# Patient Record
Sex: FEMALE | Race: WHITE | NOT HISPANIC OR LATINO | Employment: FULL TIME | URBAN - METROPOLITAN AREA
[De-identification: names, ages, dates, MRNs, and addresses within clinical notes are randomized per-mention and may not be internally consistent; named-entity substitution may affect disease eponyms.]

---

## 2019-04-19 ENCOUNTER — HOSPITAL ENCOUNTER (OUTPATIENT)
Facility: HOSPITAL | Age: 44
Setting detail: OBSERVATION
Discharge: HOME | End: 2019-04-20
Attending: EMERGENCY MEDICINE | Admitting: INTERNAL MEDICINE

## 2019-04-19 ENCOUNTER — APPOINTMENT (OUTPATIENT)
Dept: GENERAL RADIOLOGY | Facility: HOSPITAL | Age: 44
End: 2019-04-19

## 2019-04-19 DIAGNOSIS — I20.8 ANGINA AT REST (HCC): Primary | ICD-10-CM

## 2019-04-19 DIAGNOSIS — R77.8 ELEVATED TROPONIN: ICD-10-CM

## 2019-04-19 LAB
ALBUMIN SERPL-MCNC: 4.4 G/DL (ref 3.5–5.2)
ALBUMIN/GLOB SERPL: 1.2 G/DL
ALP SERPL-CCNC: 95 U/L (ref 39–117)
ALT SERPL W P-5'-P-CCNC: 89 U/L (ref 1–33)
ANION GAP SERPL CALCULATED.3IONS-SCNC: 13 MMOL/L
AST SERPL-CCNC: 71 U/L (ref 1–32)
BASOPHILS # BLD AUTO: 0.04 10*3/MM3 (ref 0–0.2)
BASOPHILS NFR BLD AUTO: 0.4 % (ref 0–1.5)
BILIRUB SERPL-MCNC: 0.4 MG/DL (ref 0.2–1.2)
BUN BLD-MCNC: 13 MG/DL (ref 6–20)
BUN/CREAT SERPL: 14.8 (ref 7–25)
CALCIUM SPEC-SCNC: 9.9 MG/DL (ref 8.6–10.5)
CHLORIDE SERPL-SCNC: 98 MMOL/L (ref 98–107)
CO2 SERPL-SCNC: 24 MMOL/L (ref 22–29)
CREAT BLD-MCNC: 0.88 MG/DL (ref 0.57–1)
DEPRECATED RDW RBC AUTO: 42.8 FL (ref 37–54)
EOSINOPHIL # BLD AUTO: 0.13 10*3/MM3 (ref 0–0.4)
EOSINOPHIL NFR BLD AUTO: 1.4 % (ref 0.3–6.2)
ERYTHROCYTE [DISTWIDTH] IN BLOOD BY AUTOMATED COUNT: 13.1 % (ref 12.3–15.4)
GFR SERPL CREATININE-BSD FRML MDRD: 69 ML/MIN/1.73
GLOBULIN UR ELPH-MCNC: 3.8 GM/DL
GLUCOSE BLD-MCNC: 112 MG/DL (ref 65–99)
HCT VFR BLD AUTO: 39.9 % (ref 34–46.6)
HGB BLD-MCNC: 13.1 G/DL (ref 12–15.9)
HOLD SPECIMEN: NORMAL
HOLD SPECIMEN: NORMAL
IMM GRANULOCYTES # BLD AUTO: 0.02 10*3/MM3 (ref 0–0.05)
IMM GRANULOCYTES NFR BLD AUTO: 0.2 % (ref 0–0.5)
LIPASE SERPL-CCNC: 94 U/L (ref 13–60)
LYMPHOCYTES # BLD AUTO: 2.96 10*3/MM3 (ref 0.7–3.1)
LYMPHOCYTES NFR BLD AUTO: 30.9 % (ref 19.6–45.3)
MCH RBC QN AUTO: 29.6 PG (ref 26.6–33)
MCHC RBC AUTO-ENTMCNC: 32.8 G/DL (ref 31.5–35.7)
MCV RBC AUTO: 90.3 FL (ref 79–97)
MONOCYTES # BLD AUTO: 0.8 10*3/MM3 (ref 0.1–0.9)
MONOCYTES NFR BLD AUTO: 8.4 % (ref 5–12)
NEUTROPHILS # BLD AUTO: 5.65 10*3/MM3 (ref 1.7–7)
NEUTROPHILS NFR BLD AUTO: 58.9 % (ref 42.7–76)
NT-PROBNP SERPL-MCNC: 307.3 PG/ML (ref 5–450)
PLATELET # BLD AUTO: 268 10*3/MM3 (ref 140–450)
PMV BLD AUTO: 10.8 FL (ref 6–12)
POTASSIUM BLD-SCNC: 4.3 MMOL/L (ref 3.5–5.2)
PROT SERPL-MCNC: 8.2 G/DL (ref 6–8.5)
RBC # BLD AUTO: 4.42 10*6/MM3 (ref 3.77–5.28)
SODIUM BLD-SCNC: 135 MMOL/L (ref 136–145)
TROPONIN I SERPL-MCNC: 2.51 NG/ML (ref 0–0.07)
WBC NRBC COR # BLD: 9.58 10*3/MM3 (ref 3.4–10.8)
WHOLE BLOOD HOLD SPECIMEN: NORMAL
WHOLE BLOOD HOLD SPECIMEN: NORMAL

## 2019-04-19 PROCEDURE — 84484 ASSAY OF TROPONIN QUANT: CPT

## 2019-04-19 PROCEDURE — 93005 ELECTROCARDIOGRAM TRACING: CPT | Performed by: EMERGENCY MEDICINE

## 2019-04-19 PROCEDURE — 99285 EMERGENCY DEPT VISIT HI MDM: CPT

## 2019-04-19 PROCEDURE — 80053 COMPREHEN METABOLIC PANEL: CPT | Performed by: EMERGENCY MEDICINE

## 2019-04-19 PROCEDURE — 85025 COMPLETE CBC W/AUTO DIFF WBC: CPT | Performed by: EMERGENCY MEDICINE

## 2019-04-19 PROCEDURE — 96372 THER/PROPH/DIAG INJ SC/IM: CPT

## 2019-04-19 PROCEDURE — 84484 ASSAY OF TROPONIN QUANT: CPT | Performed by: INTERNAL MEDICINE

## 2019-04-19 PROCEDURE — 71045 X-RAY EXAM CHEST 1 VIEW: CPT

## 2019-04-19 PROCEDURE — 25010000002 ENOXAPARIN PER 10 MG: Performed by: PHYSICIAN ASSISTANT

## 2019-04-19 PROCEDURE — 83880 ASSAY OF NATRIURETIC PEPTIDE: CPT | Performed by: EMERGENCY MEDICINE

## 2019-04-19 PROCEDURE — 83690 ASSAY OF LIPASE: CPT | Performed by: EMERGENCY MEDICINE

## 2019-04-19 RX ORDER — LISINOPRIL 5 MG/1
2.5 TABLET ORAL DAILY
COMMUNITY

## 2019-04-19 RX ORDER — CLOPIDOGREL BISULFATE 75 MG/1
75 TABLET ORAL DAILY
COMMUNITY

## 2019-04-19 RX ORDER — ATORVASTATIN CALCIUM 80 MG/1
80 TABLET, FILM COATED ORAL NIGHTLY
COMMUNITY

## 2019-04-19 RX ORDER — ASPIRIN 81 MG/1
324 TABLET, CHEWABLE ORAL ONCE
Status: COMPLETED | OUTPATIENT
Start: 2019-04-19 | End: 2019-04-19

## 2019-04-19 RX ORDER — ASPIRIN 81 MG/1
81 TABLET ORAL DAILY
COMMUNITY

## 2019-04-19 RX ORDER — SODIUM CHLORIDE 0.9 % (FLUSH) 0.9 %
10 SYRINGE (ML) INJECTION AS NEEDED
Status: DISCONTINUED | OUTPATIENT
Start: 2019-04-19 | End: 2019-04-20 | Stop reason: HOSPADM

## 2019-04-19 RX ADMIN — NITROGLYCERIN 1 INCH: 20 OINTMENT TOPICAL at 22:54

## 2019-04-19 RX ADMIN — ASPIRIN 81 MG 243 MG: 81 TABLET ORAL at 21:07

## 2019-04-19 RX ADMIN — ENOXAPARIN SODIUM 100 MG: 100 INJECTION SUBCUTANEOUS at 22:52

## 2019-04-20 VITALS
WEIGHT: 258.4 LBS | TEMPERATURE: 98 F | RESPIRATION RATE: 18 BRPM | SYSTOLIC BLOOD PRESSURE: 127 MMHG | BODY MASS INDEX: 39.16 KG/M2 | HEART RATE: 70 BPM | DIASTOLIC BLOOD PRESSURE: 80 MMHG | OXYGEN SATURATION: 95 % | HEIGHT: 68 IN

## 2019-04-20 PROBLEM — R74.01 ELEVATED TRANSAMINASE LEVEL: Status: ACTIVE | Noted: 2019-04-20

## 2019-04-20 PROBLEM — R74.01 ELEVATED TRANSAMINASE LEVEL: Status: RESOLVED | Noted: 2019-04-20 | Resolved: 2019-04-20

## 2019-04-20 PROBLEM — I10 ESSENTIAL HYPERTENSION: Status: ACTIVE | Noted: 2019-04-20

## 2019-04-20 PROBLEM — I25.2 HX OF NON-ST ELEVATION MYOCARDIAL INFARCTION (NSTEMI): Status: ACTIVE | Noted: 2019-04-20

## 2019-04-20 PROBLEM — R73.9 HYPERGLYCEMIA: Status: ACTIVE | Noted: 2019-04-20

## 2019-04-20 PROBLEM — R73.9 HYPERGLYCEMIA: Status: RESOLVED | Noted: 2019-04-20 | Resolved: 2019-04-20

## 2019-04-20 PROBLEM — I20.8 ANGINA AT REST (HCC): Status: RESOLVED | Noted: 2019-04-19 | Resolved: 2019-04-20

## 2019-04-20 LAB
ALBUMIN SERPL-MCNC: 3.8 G/DL (ref 3.5–5.2)
ALBUMIN/GLOB SERPL: 1.2 G/DL
ALP SERPL-CCNC: 83 U/L (ref 39–117)
ALT SERPL W P-5'-P-CCNC: 75 U/L (ref 1–33)
ANION GAP SERPL CALCULATED.3IONS-SCNC: 14 MMOL/L
AST SERPL-CCNC: 48 U/L (ref 1–32)
BILIRUB SERPL-MCNC: 0.4 MG/DL (ref 0.2–1.2)
BUN BLD-MCNC: 14 MG/DL (ref 6–20)
BUN/CREAT SERPL: 17.5 (ref 7–25)
CALCIUM SPEC-SCNC: 9 MG/DL (ref 8.6–10.5)
CHLORIDE SERPL-SCNC: 100 MMOL/L (ref 98–107)
CHOLEST SERPL-MCNC: 118 MG/DL (ref 0–200)
CO2 SERPL-SCNC: 22 MMOL/L (ref 22–29)
CREAT BLD-MCNC: 0.8 MG/DL (ref 0.57–1)
DEPRECATED RDW RBC AUTO: 43.1 FL (ref 37–54)
ERYTHROCYTE [DISTWIDTH] IN BLOOD BY AUTOMATED COUNT: 13.1 % (ref 12.3–15.4)
GFR SERPL CREATININE-BSD FRML MDRD: 78 ML/MIN/1.73
GLOBULIN UR ELPH-MCNC: 3.2 GM/DL
GLUCOSE BLD-MCNC: 105 MG/DL (ref 65–99)
HAV IGM SERPL QL IA: NORMAL
HBA1C MFR BLD: 4.9 % (ref 4.8–5.6)
HBV CORE IGM SERPL QL IA: NORMAL
HBV SURFACE AG SERPL QL IA: NORMAL
HCT VFR BLD AUTO: 36.4 % (ref 34–46.6)
HCV AB SER DONR QL: NORMAL
HDLC SERPL-MCNC: 38 MG/DL (ref 40–60)
HGB BLD-MCNC: 11.7 G/DL (ref 12–15.9)
INR PPP: 1.05 (ref 0.85–1.16)
LDLC SERPL CALC-MCNC: 53 MG/DL (ref 0–100)
LDLC/HDLC SERPL: 1.39 {RATIO}
MCH RBC QN AUTO: 29.2 PG (ref 26.6–33)
MCHC RBC AUTO-ENTMCNC: 32.1 G/DL (ref 31.5–35.7)
MCV RBC AUTO: 90.8 FL (ref 79–97)
PLATELET # BLD AUTO: 227 10*3/MM3 (ref 140–450)
PMV BLD AUTO: 10.9 FL (ref 6–12)
POTASSIUM BLD-SCNC: 3.9 MMOL/L (ref 3.5–5.2)
PROT SERPL-MCNC: 7 G/DL (ref 6–8.5)
PROTHROMBIN TIME: 13.2 SECONDS (ref 11.2–14.3)
RBC # BLD AUTO: 4.01 10*6/MM3 (ref 3.77–5.28)
SODIUM BLD-SCNC: 136 MMOL/L (ref 136–145)
TRIGL SERPL-MCNC: 136 MG/DL (ref 0–150)
TROPONIN T SERPL-MCNC: 0.66 NG/ML (ref 0–0.03)
TROPONIN T SERPL-MCNC: 0.8 NG/ML (ref 0–0.03)
TSH SERPL DL<=0.05 MIU/L-ACNC: 3.56 MIU/ML (ref 0.27–4.2)
VLDLC SERPL-MCNC: 27.2 MG/DL
WBC NRBC COR # BLD: 10.13 10*3/MM3 (ref 3.4–10.8)

## 2019-04-20 PROCEDURE — 84443 ASSAY THYROID STIM HORMONE: CPT | Performed by: PHYSICIAN ASSISTANT

## 2019-04-20 PROCEDURE — 99243 OFF/OP CNSLTJ NEW/EST LOW 30: CPT | Performed by: INTERNAL MEDICINE

## 2019-04-20 PROCEDURE — 80074 ACUTE HEPATITIS PANEL: CPT | Performed by: PHYSICIAN ASSISTANT

## 2019-04-20 PROCEDURE — G0378 HOSPITAL OBSERVATION PER HR: HCPCS

## 2019-04-20 PROCEDURE — 99220 PR INITIAL OBSERVATION CARE/DAY 70 MINUTES: CPT | Performed by: INTERNAL MEDICINE

## 2019-04-20 PROCEDURE — 84484 ASSAY OF TROPONIN QUANT: CPT | Performed by: PHYSICIAN ASSISTANT

## 2019-04-20 PROCEDURE — 83036 HEMOGLOBIN GLYCOSYLATED A1C: CPT | Performed by: PHYSICIAN ASSISTANT

## 2019-04-20 PROCEDURE — 99217 PR OBSERVATION CARE DISCHARGE MANAGEMENT: CPT | Performed by: INTERNAL MEDICINE

## 2019-04-20 PROCEDURE — 80053 COMPREHEN METABOLIC PANEL: CPT | Performed by: PHYSICIAN ASSISTANT

## 2019-04-20 PROCEDURE — 85610 PROTHROMBIN TIME: CPT | Performed by: PHYSICIAN ASSISTANT

## 2019-04-20 PROCEDURE — 85027 COMPLETE CBC AUTOMATED: CPT | Performed by: PHYSICIAN ASSISTANT

## 2019-04-20 PROCEDURE — 80061 LIPID PANEL: CPT | Performed by: PHYSICIAN ASSISTANT

## 2019-04-20 RX ORDER — NITROGLYCERIN 0.4 MG/1
0.4 TABLET SUBLINGUAL
Status: DISCONTINUED | OUTPATIENT
Start: 2019-04-20 | End: 2019-04-20 | Stop reason: HOSPADM

## 2019-04-20 RX ORDER — ATORVASTATIN CALCIUM 40 MG/1
80 TABLET, FILM COATED ORAL NIGHTLY
Status: DISCONTINUED | OUTPATIENT
Start: 2019-04-20 | End: 2019-04-20 | Stop reason: HOSPADM

## 2019-04-20 RX ORDER — SODIUM CHLORIDE 0.9 % (FLUSH) 0.9 %
3 SYRINGE (ML) INJECTION EVERY 12 HOURS SCHEDULED
Status: DISCONTINUED | OUTPATIENT
Start: 2019-04-20 | End: 2019-04-20 | Stop reason: HOSPADM

## 2019-04-20 RX ORDER — NITROGLYCERIN 0.4 MG/1
0.4 TABLET SUBLINGUAL
Qty: 25 TABLET | Refills: 6 | Status: SHIPPED | OUTPATIENT
Start: 2019-04-20 | End: 2019-04-20

## 2019-04-20 RX ORDER — LISINOPRIL 2.5 MG/1
2.5 TABLET ORAL DAILY
Status: DISCONTINUED | OUTPATIENT
Start: 2019-04-20 | End: 2019-04-20 | Stop reason: HOSPADM

## 2019-04-20 RX ORDER — CLOPIDOGREL BISULFATE 75 MG/1
75 TABLET ORAL DAILY
Status: DISCONTINUED | OUTPATIENT
Start: 2019-04-20 | End: 2019-04-20 | Stop reason: HOSPADM

## 2019-04-20 RX ORDER — NITROGLYCERIN 0.4 MG/1
0.4 TABLET SUBLINGUAL
Qty: 25 TABLET | Refills: 5 | Status: SHIPPED | OUTPATIENT
Start: 2019-04-20

## 2019-04-20 RX ORDER — ASPIRIN 81 MG/1
81 TABLET ORAL DAILY
Status: DISCONTINUED | OUTPATIENT
Start: 2019-04-20 | End: 2019-04-20 | Stop reason: HOSPADM

## 2019-04-20 RX ORDER — SODIUM CHLORIDE 0.9 % (FLUSH) 0.9 %
3-10 SYRINGE (ML) INJECTION AS NEEDED
Status: DISCONTINUED | OUTPATIENT
Start: 2019-04-20 | End: 2019-04-20 | Stop reason: HOSPADM

## 2019-04-20 RX ADMIN — METOPROLOL TARTRATE 12.5 MG: 25 TABLET, FILM COATED ORAL at 08:42

## 2019-04-20 RX ADMIN — ASPIRIN 81 MG: 81 TABLET, COATED ORAL at 08:42

## 2019-04-20 RX ADMIN — CLOPIDOGREL BISULFATE 75 MG: 75 TABLET ORAL at 08:42

## 2019-04-20 RX ADMIN — LISINOPRIL 2.5 MG: 2.5 TABLET ORAL at 08:42

## 2019-04-20 RX ADMIN — SODIUM CHLORIDE, PRESERVATIVE FREE 3 ML: 5 INJECTION INTRAVENOUS at 08:42

## 2019-04-20 NOTE — CONSULTS
[facility] Consult Note      Referring Provider: No ref. provider found  Primary Provider:  Provider, No Known  Reason for Consultation:  Chest pain    Patient Care Team:  Provider, No Known as PCP - General    Chief complaint:  Chest pain     Identification:  44 year old female      Problem list:    1. Coronary artery disease  1. NSTEMI ([place]) 4/14/19   2. LHC:  100% left mid circ (PTCA with residual 10%).  Too small for stent  2. Hypertension    Allergies:  Patient has no known allergies.    Home/Current Medications:    Scheduled Meds:  aspirin 81 mg Oral Daily   atorvastatin 80 mg Oral Nightly   clopidogrel 75 mg Oral Daily   lisinopril 2.5 mg Oral Daily   metoprolol tartrate 12.5 mg Oral Q12H   Pharmacy Meds to Bed Consult  Does not apply Daily   sodium chloride 3 mL Intravenous Q12H     Continuous Infusions:   PRN Meds:.nitroglycerin  •  sodium chloride  •  sodium chloride      History of present illness:    Patient is a very pleasant 44-year-old female with the above-noted medical history who presented to [facility] emergency department complaining of midsternal chest pain.  She is in town visiting family for the Easter weekend.  She she lives in [place] and recently underwent a cardiac catheterization on 4/15/2019 for non-STEMI.  She had 100% left mid circumflex occlusion and received PTCA as it was felt that the vessel was too small for stent placement.  She states the residual stenosis was left at 10%.  She has been taking her aspirin, statin, Plavix, ACE and beta-blocker faithfully and had felt well until she arrived here after the drive from [place].  She states the pain that she experienced yesterday is different than what she felt prior to stent placement.  She has had no further reoccurrence.  She has a follow-up already scheduled with her primary cardiologist on Wednesday, April 24.      Cardiac Risk Factors:  Hypertension, early onset CAD        Social History:  Social History  "    Socioeconomic History   • Marital status: Single     Spouse name: Not on file   • Number of children: Not on file   • Years of education: Not on file   • Highest education level: Not on file   Tobacco Use   • Smoking status: Never Smoker   Substance and Sexual Activity   • Alcohol use: Yes     Frequency: Never     Comment: socially   • Drug use: No     Family History:  History reviewed. No pertinent family history.     Review of Systems  Pertinent positives are listed in the HPI.  All other systems reviewed are negative.         Objective     Vital Sign Min/Max for last 24 hours  Temp  Min: 97.5 °F (36.4 °C)  Max: 98.1 °F (36.7 °C)   BP  Min: 108/79  Max: 131/97   Pulse  Min: 75  Max: 98   Resp  Min: 16  Max: 17   SpO2  Min: 95 %  Max: 97 %   No Data Recorded   Weight  Min: 99.8 kg (220 lb)  Max: 117 kg (258 lb 6.4 oz)     Flowsheet Rows      First Filed Value   Admission Height  172.7 cm (68\") Documented at 04/19/2019 2017   Admission Weight  99.8 kg (220 lb) Documented at 04/19/2019 2017          Physical Exam:    GENERAL: well-developed, well-nourished; in no acute distress.   HEENT:  Normocephalic and atraumatic  NECK:  Carotid upstrokes are 2+ and  symmetrical without bruits.   LUNGS: Clear to auscultation bilaterally without wheezing, rhonchi, or rales noted.   CARDIOVASCULAR: The heart has a regular rate with a normal S1 and S2. There is no murmur, gallop, rub, or click appreciated. The PMI is nondisplaced.   ABDOMEN: Soft and nontender  NEUROLOGICAL: Nonfocal; Alert and oriented  PERIPHERAL VASCULAR:  Posterior tibial pulses are 2+ and symmetrical. There is no peripheral edema.   SKIN:  Warm and dry  PSYCHIATRIC: normal mood and affect; behavior appropriate     EKG:  SR @ 84.  No acute changes  Echo:  none    Labs:      Results from last 7 days   Lab Units 04/20/19  0524 04/19/19  2109   SODIUM mmol/L 136 135*   POTASSIUM mmol/L 3.9 4.3   CHLORIDE mmol/L 100 98   CO2 mmol/L 22.0 24.0   BUN mg/dL 14 13 "   CREATININE mg/dL 0.80 0.88   CALCIUM mg/dL 9.0 9.9   BILIRUBIN mg/dL 0.4 0.4   ALK PHOS U/L 83 95   ALT (SGPT) U/L 75* 89*   AST (SGOT) U/L 48* 71*   GLUCOSE mg/dL 105* 112*       Lab Results (last 24 hours)     Procedure Component Value Units Date/Time    Hepatitis Panel, Acute [[id number]]  (Normal) Collected:  04/20/19 0524    Specimen:  Blood Updated:  04/20/19 0810     Hepatitis B Surface Ag Non-Reactive     Hep A IgM Non-Reactive     Hep B C IgM Non-Reactive     Hepatitis C Ab Non-Reactive    Comprehensive Metabolic Panel [[id number]]  (Abnormal) Collected:  04/20/19 0524    Specimen:  Blood Updated:  04/20/19 0746     Glucose 105 mg/dL      BUN 14 mg/dL      Creatinine 0.80 mg/dL      Sodium 136 mmol/L      Potassium 3.9 mmol/L      Chloride 100 mmol/L      CO2 22.0 mmol/L      Calcium 9.0 mg/dL      Total Protein 7.0 g/dL      Albumin 3.80 g/dL      ALT (SGPT) 75 U/L      AST (SGOT) 48 U/L      Alkaline Phosphatase 83 U/L      Total Bilirubin 0.4 mg/dL      eGFR Non African Amer 78 mL/min/1.73      Globulin 3.2 gm/dL      A/G Ratio 1.2 g/dL      BUN/Creatinine Ratio 17.5     Anion Gap 14.0 mmol/L     Lipid Panel [[id number]]  (Abnormal) Collected:  04/20/19 0524    Specimen:  Blood Updated:  04/20/19 0746     Total Cholesterol 118 mg/dL      Triglycerides 136 mg/dL      HDL Cholesterol 38 mg/dL      LDL Cholesterol  53 mg/dL      VLDL Cholesterol 27.2 mg/dL      LDL/HDL Ratio 1.39    Troponin [[id number]]  (Abnormal) Collected:  04/20/19 0524    Specimen:  Blood Updated:  04/20/19 0735     Troponin T 0.657 ng/mL     TSH [[id number]]  (Normal) Collected:  04/20/19 0524    Specimen:  Blood Updated:  04/20/19 0716     TSH 3.560 mIU/mL     CBC (No Diff) [[id number]]  (Abnormal) Collected:  04/20/19 0524    Specimen:  Blood Updated:  04/20/19 0630     WBC 10.13 10*3/mm3      RBC 4.01 10*6/mm3      Hemoglobin 11.7 g/dL      Hematocrit 36.4 %      MCV 90.8 fL      MCH 29.2 pg      MCHC 32.1 g/dL      RDW 13.1 %       RDW-SD 43.1 fl      MPV 10.9 fL      Platelets 227 10*3/mm3     Protime-INR [[id number]]  (Normal) Collected:  04/20/19 0524    Specimen:  Blood Updated:  04/20/19 0608     Protime 13.2 Seconds      INR 1.05    Hemoglobin A1c [[id number]] Collected:  04/20/19 0524    Specimen:  Blood Updated:  04/20/19 0604    Troponin [[id number]]  (Abnormal) Collected:  04/19/19 2358    Specimen:  Blood Updated:  04/20/19 0054     Troponin T 0.802 ng/mL     Comprehensive Metabolic Panel [[id number]]  (Abnormal) Collected:  04/19/19 2109    Specimen:  Blood Updated:  04/19/19 2151     Glucose 112 mg/dL      BUN 13 mg/dL      Creatinine 0.88 mg/dL      Sodium 135 mmol/L      Potassium 4.3 mmol/L      Chloride 98 mmol/L      CO2 24.0 mmol/L      Calcium 9.9 mg/dL      Total Protein 8.2 g/dL      Albumin 4.40 g/dL      ALT (SGPT) 89 U/L      AST (SGOT) 71 U/L      Comment: Specimen hemolyzed.  Results may be affected.        Alkaline Phosphatase 95 U/L      Total Bilirubin 0.4 mg/dL      eGFR Non African Amer 69 mL/min/1.73      Globulin 3.8 gm/dL      A/G Ratio 1.2 g/dL      BUN/Creatinine Ratio 14.8     Anion Gap 13.0 mmol/L     Lipase [[id number]]  (Abnormal) Collected:  04/19/19 2109    Specimen:  Blood Updated:  04/19/19 2150     Lipase 94 U/L     BNP [[id number]]  (Normal) Collected:  04/19/19 2109    Specimen:  Blood Updated:  04/19/19 2146     proBNP 307.3 pg/mL     POC Troponin, Rapid [[id number]]  (Abnormal) Collected:  04/19/19 2110    Specimen:  Blood Updated:  04/19/19 2133     Troponin I 2.51 ng/mL      Comment: Serial Number: [id number]:  [id number]       CBC Auto Differential [[id number]]  (Normal) Collected:  04/19/19 2109    Specimen:  Blood Updated:  04/19/19 2124     WBC 9.58 10*3/mm3      RBC 4.42 10*6/mm3      Hemoglobin 13.1 g/dL      Hematocrit 39.9 %      MCV 90.3 fL      MCH 29.6 pg      MCHC 32.8 g/dL      RDW 13.1 %      RDW-SD 42.8 fl      MPV 10.8 fL      Platelets 268 10*3/mm3      Neutrophil % 58.9 %       Lymphocyte % 30.9 %      Monocyte % 8.4 %      Eosinophil % 1.4 %      Basophil % 0.4 %      Immature Grans % 0.2 %      Neutrophils, Absolute 5.65 10*3/mm3      Lymphocytes, Absolute 2.96 10*3/mm3      Monocytes, Absolute 0.80 10*3/mm3      Eosinophils, Absolute 0.13 10*3/mm3      Basophils, Absolute 0.04 10*3/mm3      Immature Grans, Absolute 0.02 10*3/mm3         Lab Results   Component Value Date    TROPONINT 0.657 (C) 04/20/2019    TROPONINT 0.802 (C) 04/19/2019        Lab Results   Component Value Date    CHOL 118 04/20/2019    TRIG 136 04/20/2019    HDL 38 (L) 04/20/2019    LDL 53 04/20/2019       Lab Results   Component Value Date    INR 1.05 04/20/2019    PROTIME 13.2 04/20/2019         Results Review:  I reviewed the patients new clinical results.      Assessment:  1. Chest pain, atypical  1. CAD with recent NSTEMI 4/14/19  2. LHC 4/15/19:  PTCA to mid circ; too small for stent  3. Negative EKGs and troponins are falling c/w previous MI.  2. Hypertension      Plan:  Continue aspirin, Plavix, statin, ACE inhibitor and beta-blocker  Will prescribe ntg SL for home  Keep appointment with primary cardiologist next week.    Ok to dc from cardiac standpoint.       I discussed the patients findings and my recommendations with patient.    Scribed for Dee Aguirre MD by BILL Herman on 04/19/2019 at 8:42 AM    BILL Myers  04/20/19  8:42 AM    I Dee Aguirre MD personally performed the services described in this documentation as scribed by the above individual in my presence, and it is both accurate and complete.    Dee Aguirre MD, FACC

## 2019-04-20 NOTE — PLAN OF CARE
Problem: Cardiac: ACS (Acute Coronary Syndrome) (Adult)  Goal: Signs and Symptoms of Listed Potential Problems Will be Absent, Minimized or Managed (Cardiac: ACS)  Outcome: Ongoing (interventions implemented as appropriate)   04/20/19 0456   Goal/Outcome Evaluation   Problems Present (Acute Coronary Syn) chest pain (angina)

## 2019-04-20 NOTE — ED PROVIDER NOTES
Subjective   This patient is a 44-year-old female that comes emerged bar today complaining of having chest pain.  Patient reportedly was seen in [place] on Sunday and had a non-STEMI.  Reportedly the patient had 100% occlusion of mid left circumflex.  It was too small to be stented so they had to angioplasty.  Patient reports that she has been having no discomfort and was feeling better.  Apparently her troponins got as high as 30.  Patient reports she began having chest pain again today she did not have diaphoresis or nausea or vomiting associated with this.  Patient reports she has had had no prior history of hyperlipidemia, hypertension, diabetes.        History provided by:  Patient   used: No    Chest Pain   Pain location:  Substernal area  Pain quality: pressure    Pain radiates to:  Does not radiate  Pain severity:  Moderate  Onset quality:  Sudden  Timing:  Intermittent  Progression:  Waxing and waning  Chronicity:  New  Context: not breathing, not drug use, not movement, not at rest, not stress and not trauma    Relieved by:  Nitroglycerin  Worsened by:  Nothing  Ineffective treatments:  None tried  Associated symptoms: shortness of breath    Associated symptoms: no AICD problem, no back pain, no dizziness, no fever, no nausea, no palpitations, no vomiting and no weakness    Risk factors: coronary artery disease    Risk factors: no aortic disease, no high cholesterol, no hypertension, no immobilization, not male, no smoking and no surgery        Review of Systems   Constitutional: Negative for chills and fever.   Respiratory: Positive for chest tightness and shortness of breath. Negative for wheezing.    Cardiovascular: Positive for chest pain. Negative for palpitations.   Gastrointestinal: Negative for diarrhea, nausea and vomiting.   Genitourinary: Negative for dysuria and frequency.   Musculoskeletal: Negative for back pain and neck pain.   Skin: Negative for pallor and rash.    Neurological: Negative for dizziness and weakness.   Hematological: Negative for adenopathy.   Psychiatric/Behavioral: Negative.    All other systems reviewed and are negative.      Past Medical History:   Diagnosis Date   • Hypertension    • Myocardial infarction (CMS/HCC)        No Known Allergies    Past Surgical History:   Procedure Laterality Date   • CARDIAC CATHETERIZATION     • CORONARY ANGIOPLASTY         History reviewed. No pertinent family history.    Social History     Socioeconomic History   • Marital status: Single     Spouse name: Not on file   • Number of children: Not on file   • Years of education: Not on file   • Highest education level: Not on file   Tobacco Use   • Smoking status: Never Smoker   Substance and Sexual Activity   • Alcohol use: Yes     Frequency: Never     Comment: socially   • Drug use: No           Objective   Physical Exam   Constitutional: She is oriented to person, place, and time. She appears well-developed and well-nourished.   HENT:   Head: Normocephalic and atraumatic.   Right Ear: External ear normal.   Left Ear: External ear normal.   Nose: Nose normal.   Mouth/Throat: Oropharynx is clear and moist.   Eyes: Conjunctivae and EOM are normal. Pupils are equal, round, and reactive to light. No scleral icterus.   Neck: Normal range of motion. No thyromegaly present.   Cardiovascular: Normal rate, regular rhythm, normal heart sounds and normal pulses.   Pulmonary/Chest: Effort normal and breath sounds normal. No respiratory distress. She has no wheezes. She has no rales. She exhibits no tenderness.   Abdominal: Soft. Bowel sounds are normal. She exhibits no distension. There is no tenderness.   Musculoskeletal: Normal range of motion.   Lymphadenopathy:     She has no cervical adenopathy.   Neurological: She is alert and oriented to person, place, and time. She has normal reflexes. She displays normal reflexes. No cranial nerve deficit. Coordination normal.   Skin: Skin is  warm and dry.   Psychiatric: She has a normal mood and affect. Her behavior is normal. Judgment and thought content normal.   Nursing note and vitals reviewed.      Procedures           ED Course  ED Course as of Apr 19 2358 Fri Apr 19, 2019 2320 Discussed the findings with the patient and the family.  L have recurrent chest pain with elevated troponin patient will be admitted I spoke to the hospitalist on call Dr. Fatima is agreed to admit the patient to telemetry.  [KARON]      ED Course User Index  [KARON] Lloyd Olivarez PA        Recent Results (from the past 24 hour(s))   Comprehensive Metabolic Panel    Collection Time: 04/19/19  9:09 PM   Result Value Ref Range    Glucose 112 (H) 65 - 99 mg/dL    BUN 13 6 - 20 mg/dL    Creatinine 0.88 0.57 - 1.00 mg/dL    Sodium 135 (L) 136 - 145 mmol/L    Potassium 4.3 3.5 - 5.2 mmol/L    Chloride 98 98 - 107 mmol/L    CO2 24.0 22.0 - 29.0 mmol/L    Calcium 9.9 8.6 - 10.5 mg/dL    Total Protein 8.2 6.0 - 8.5 g/dL    Albumin 4.40 3.50 - 5.20 g/dL    ALT (SGPT) 89 (H) 1 - 33 U/L    AST (SGOT) 71 (H) 1 - 32 U/L    Alkaline Phosphatase 95 39 - 117 U/L    Total Bilirubin 0.4 0.2 - 1.2 mg/dL    eGFR Non African Amer 69 >60 mL/min/1.73    Globulin 3.8 gm/dL    A/G Ratio 1.2 g/dL    BUN/Creatinine Ratio 14.8 7.0 - 25.0    Anion Gap 13.0 mmol/L   Lipase    Collection Time: 04/19/19  9:09 PM   Result Value Ref Range    Lipase 94 (H) 13 - 60 U/L   BNP    Collection Time: 04/19/19  9:09 PM   Result Value Ref Range    proBNP 307.3 5.0 - 450.0 pg/mL   Light Blue Top    Collection Time: 04/19/19  9:09 PM   Result Value Ref Range    Extra Tube hold for add-on    Green Top (Gel)    Collection Time: 04/19/19  9:09 PM   Result Value Ref Range    Extra Tube Hold for add-ons.    Lavender Top    Collection Time: 04/19/19  9:09 PM   Result Value Ref Range    Extra Tube hold for add-on    Gold Top - SST    Collection Time: 04/19/19  9:09 PM   Result Value Ref Range    Extra Tube Hold for add-ons.  "   CBC Auto Differential    Collection Time: 04/19/19  9:09 PM   Result Value Ref Range    WBC 9.58 3.40 - 10.80 10*3/mm3    RBC 4.42 3.77 - 5.28 10*6/mm3    Hemoglobin 13.1 12.0 - 15.9 g/dL    Hematocrit 39.9 34.0 - 46.6 %    MCV 90.3 79.0 - 97.0 fL    MCH 29.6 26.6 - 33.0 pg    MCHC 32.8 31.5 - 35.7 g/dL    RDW 13.1 12.3 - 15.4 %    RDW-SD 42.8 37.0 - 54.0 fl    MPV 10.8 6.0 - 12.0 fL    Platelets 268 140 - 450 10*3/mm3    Neutrophil % 58.9 42.7 - 76.0 %    Lymphocyte % 30.9 19.6 - 45.3 %    Monocyte % 8.4 5.0 - 12.0 %    Eosinophil % 1.4 0.3 - 6.2 %    Basophil % 0.4 0.0 - 1.5 %    Immature Grans % 0.2 0.0 - 0.5 %    Neutrophils, Absolute 5.65 1.70 - 7.00 10*3/mm3    Lymphocytes, Absolute 2.96 0.70 - 3.10 10*3/mm3    Monocytes, Absolute 0.80 0.10 - 0.90 10*3/mm3    Eosinophils, Absolute 0.13 0.00 - 0.40 10*3/mm3    Basophils, Absolute 0.04 0.00 - 0.20 10*3/mm3    Immature Grans, Absolute 0.02 0.00 - 0.05 10*3/mm3   POC Troponin, Rapid    Collection Time: 04/19/19  9:10 PM   Result Value Ref Range    Troponin I 2.51 (C) 0.00 - 0.07 ng/mL     Note: In addition to lab results from this visit, the labs listed above may include labs taken at another facility or during a different encounter within the last 24 hours. Please correlate lab times with ED admission and discharge times for further clarification of the services performed during this visit.    XR Chest 1 View   Final Result   No acute cardiopulmonary findings.      Signer Name: Florentin Trevino MD    Signed: 4/19/2019 9:24 PM    Workstation Name: [id number]            Vitals:    04/19/19 2017 04/19/19 2114 04/19/19 2251   BP: 127/72 131/97 117/83   BP Location: Left arm     Patient Position: Sitting     Pulse: 93 89 75   Resp: 16     Temp: 98.1 °F (36.7 °C)     TempSrc: Oral     SpO2: 95% 97% 95%   Weight: 99.8 kg (220 lb)     Height: 172.7 cm (68\")       Medications   sodium chloride 0.9 % flush 10 mL (not administered)   aspirin chewable tablet 324 mg (243 mg " Oral Given 4/19/19 2107)   nitroglycerin (NITROSTAT) ointment 1 inch (1 inch Topical Given 4/19/19 2254)   enoxaparin (LOVENOX) syringe 100 mg (100 mg Subcutaneous Given 4/19/19 2252)     ECG/EMG Results (last 24 hours)     Procedure Component Value Units Date/Time    ECG 12 Lead [[id number]] Collected:  04/19/19 2023     Updated:  04/19/19 2024        ECG 12 Lead         ECG 12 Lead           Recent Results (from the past 24 hour(s))   Comprehensive Metabolic Panel    Collection Time: 04/19/19  9:09 PM   Result Value Ref Range    Glucose 112 (H) 65 - 99 mg/dL    BUN 13 6 - 20 mg/dL    Creatinine 0.88 0.57 - 1.00 mg/dL    Sodium 135 (L) 136 - 145 mmol/L    Potassium 4.3 3.5 - 5.2 mmol/L    Chloride 98 98 - 107 mmol/L    CO2 24.0 22.0 - 29.0 mmol/L    Calcium 9.9 8.6 - 10.5 mg/dL    Total Protein 8.2 6.0 - 8.5 g/dL    Albumin 4.40 3.50 - 5.20 g/dL    ALT (SGPT) 89 (H) 1 - 33 U/L    AST (SGOT) 71 (H) 1 - 32 U/L    Alkaline Phosphatase 95 39 - 117 U/L    Total Bilirubin 0.4 0.2 - 1.2 mg/dL    eGFR Non African Amer 69 >60 mL/min/1.73    Globulin 3.8 gm/dL    A/G Ratio 1.2 g/dL    BUN/Creatinine Ratio 14.8 7.0 - 25.0    Anion Gap 13.0 mmol/L   Lipase    Collection Time: 04/19/19  9:09 PM   Result Value Ref Range    Lipase 94 (H) 13 - 60 U/L   BNP    Collection Time: 04/19/19  9:09 PM   Result Value Ref Range    proBNP 307.3 5.0 - 450.0 pg/mL   Light Blue Top    Collection Time: 04/19/19  9:09 PM   Result Value Ref Range    Extra Tube hold for add-on    Green Top (Gel)    Collection Time: 04/19/19  9:09 PM   Result Value Ref Range    Extra Tube Hold for add-ons.    Lavender Top    Collection Time: 04/19/19  9:09 PM   Result Value Ref Range    Extra Tube hold for add-on    Gold Top - SST    Collection Time: 04/19/19  9:09 PM   Result Value Ref Range    Extra Tube Hold for add-ons.    CBC Auto Differential    Collection Time: 04/19/19  9:09 PM   Result Value Ref Range    WBC 9.58 3.40 - 10.80 10*3/mm3    RBC 4.42 3.77 - 5.28  "10*6/mm3    Hemoglobin 13.1 12.0 - 15.9 g/dL    Hematocrit 39.9 34.0 - 46.6 %    MCV 90.3 79.0 - 97.0 fL    MCH 29.6 26.6 - 33.0 pg    MCHC 32.8 31.5 - 35.7 g/dL    RDW 13.1 12.3 - 15.4 %    RDW-SD 42.8 37.0 - 54.0 fl    MPV 10.8 6.0 - 12.0 fL    Platelets 268 140 - 450 10*3/mm3    Neutrophil % 58.9 42.7 - 76.0 %    Lymphocyte % 30.9 19.6 - 45.3 %    Monocyte % 8.4 5.0 - 12.0 %    Eosinophil % 1.4 0.3 - 6.2 %    Basophil % 0.4 0.0 - 1.5 %    Immature Grans % 0.2 0.0 - 0.5 %    Neutrophils, Absolute 5.65 1.70 - 7.00 10*3/mm3    Lymphocytes, Absolute 2.96 0.70 - 3.10 10*3/mm3    Monocytes, Absolute 0.80 0.10 - 0.90 10*3/mm3    Eosinophils, Absolute 0.13 0.00 - 0.40 10*3/mm3    Basophils, Absolute 0.04 0.00 - 0.20 10*3/mm3    Immature Grans, Absolute 0.02 0.00 - 0.05 10*3/mm3   POC Troponin, Rapid    Collection Time: 04/19/19  9:10 PM   Result Value Ref Range    Troponin I 2.51 (C) 0.00 - 0.07 ng/mL     Note: In addition to lab results from this visit, the labs listed above may include labs taken at another facility or during a different encounter within the last 24 hours. Please correlate lab times with ED admission and discharge times for further clarification of the services performed during this visit.    XR Chest 1 View   Final Result   No acute cardiopulmonary findings.      Signer Name: Florentin Trevino MD    Signed: 4/19/2019 9:24 PM    Workstation Name: [id number]            Vitals:    04/19/19 2017 04/19/19 2114 04/19/19 2251   BP: 127/72 131/97 117/83   BP Location: Left arm     Patient Position: Sitting     Pulse: 93 89 75   Resp: 16     Temp: 98.1 °F (36.7 °C)     TempSrc: Oral     SpO2: 95% 97% 95%   Weight: 99.8 kg (220 lb)     Height: 172.7 cm (68\")       Medications   sodium chloride 0.9 % flush 10 mL (not administered)   aspirin chewable tablet 324 mg (243 mg Oral Given 4/19/19 2107)   nitroglycerin (NITROSTAT) ointment 1 inch (1 inch Topical Given 4/19/19 2254)   enoxaparin (LOVENOX) syringe 100 mg (100 " mg Subcutaneous Given 4/19/19 8682)     ECG/EMG Results (last 24 hours)     Procedure Component Value Units Date/Time    ECG 12 Lead [[id number]] Collected:  04/19/19 2023     Updated:  04/19/19 2024        ECG 12 Lead         ECG 12 Lead                       MDM  Number of Diagnoses or Management Options  Angina at rest (CMS/HCC): established and worsening  Elevated troponin: new and requires workup     Amount and/or Complexity of Data Reviewed  Clinical lab tests: reviewed and ordered  Tests in the radiology section of CPT®: reviewed and ordered  Tests in the medicine section of CPT®: ordered and reviewed  Discuss the patient with other providers: yes    Patient Progress  Patient progress: improved        Final diagnoses:   Angina at rest (CMS/HCC)   Elevated troponin            Lloyd Olivarez PA  04/19/19 6410

## 2019-04-20 NOTE — PROGRESS NOTES
Patient seen and examined during rounds.  H&P has been reviewed.  In brief, this is a 44-year-old female with history of hypertension, recent (4/14/2019) NSTEMI s/p [facility] with ballon angioplasty to the left mid circumflex (100% occlusion) she p/w  Chest pain, troponin elevated, EKG unrevealing. This morning she denies any chest pain, endorses some mild discomfort. She is currently awaiting cardiology evaluation. Continue plan of care as per admission H&P.

## 2019-04-20 NOTE — H&P
"    [facility] Medicine Services  HISTORY AND PHYSICAL    Patient Name: Michelle Bird  : 1975  MRN: [id number]  Primary Care Physician: Provider, No Known  Date of admission: 2019      Subjective   Subjective     Chief Complaint: Chest pain     HPI:  Michelle Bird is a 44 y.o. female with medical hx significant for recent NSTEMI and HTN presented to [facility] ED complaining of chest pain that started an hour before arriving to the ED. The patient was hospitalized in [place], [place] for an NSTEMI on 19 and underwent a heart cath that revealed 100% of the left mid circumflex. They were unable to deploy a stent because of the size of the vessel. Patient was discharged on aspirin, atorvastatin, plavix, lisinopril and metoprolol. She states presented with sudden onset of substernal chest pain and diaphoresis. She was discharged on Wednesday and has been asymptomatic until today. The patient is visiting family for the weekend in [place] and c/o a \"sorenss\" on the left side of her chest that she rates a 1/10. And also c/o a \"strange sensation in the back of her throat.\" She denies associated nausea, diaphoresis, or SOB. No radiation to the jaw, left shoulder or arm. She denies PMHx of hyperlipidemia or diabetes. Patient's father had valvular disease, no other family hx. No use of tobacco and occasional alcohol.     While in the ED, vitals stable, labs revealed troponin 2.51 followed by 0.802, Glucose 112, Sodium 135, ALT/AST 89/71, Lipase 94. EKG NSR. Normal CXR. Patient received aspirin 324mg, Lovenox, and NTG paste in the ED. The patient will be admitted to the hospitalist service for further medical management.     Review of Systems   Constitutional: Negative for chills, diaphoresis, fatigue and fever.   HENT: Positive for sore throat (\"funny feeling in throat\"). Negative for congestion and sinus pressure.    Eyes: Negative for pain and visual disturbance.   Respiratory: Negative " "for cough, chest tightness, shortness of breath and wheezing.    Cardiovascular: Positive for chest pain (\"pulled muscle sensation\"). Negative for palpitations and leg swelling.   Gastrointestinal: Negative for abdominal pain, blood in stool, constipation, diarrhea, nausea and vomiting.   Endocrine: Negative for cold intolerance and heat intolerance.   Genitourinary: Negative for difficulty urinating and dysuria.   Musculoskeletal: Negative for back pain and myalgias.   Skin: Negative for rash and wound.   Neurological: Negative for dizziness, syncope, weakness and headaches.   Hematological: Negative for adenopathy. Does not bruise/bleed easily.   Psychiatric/Behavioral: Negative for agitation and confusion.      Otherwise complete ROS reviewed and is negative except as mentioned in the HPI.    Personal History     Past Medical History:   Diagnosis Date   • Hypertension    • Myocardial infarction (CMS/HCC)        Past Surgical History:   Procedure Laterality Date   • CARDIAC CATHETERIZATION     • CORONARY ANGIOPLASTY         Family History: family history is not on file. Otherwise pertinent FHx was reviewed and unremarkable.     Social History:  reports that she has never smoked. She does not have any smokeless tobacco history on file. She reports that she drinks alcohol. She reports that she does not use drugs.  Social History     Social History Narrative   • Not on file       Medications:    Available home medication information reviewed.    (Not in a hospital admission)    No Known Allergies    Objective   Objective     Vital Signs:   Temp:  [98.1 °F (36.7 °C)] 98.1 °F (36.7 °C)  Heart Rate:  [75-93] 85  Resp:  [16] 16  BP: (108-131)/(72-97) 108/79        Physical Exam   Constitutional: Awake, alert, lying in bed   Eyes: PERRLA, sclerae anicteric, no conjunctival injection  HENT: NCAT, mucous membranes moist  Neck: Supple, no thyromegaly, no lymphadenopathy, trachea midline  Respiratory: Clear to auscultation " bilaterally, nonlabored respirations   Cardiovascular: RRR, no murmurs, rubs, or gallops, palpable pedal pulses bilaterally  Gastrointestinal: Positive bowel sounds, soft, nontender, nondistended  Musculoskeletal: No bilateral ankle edema, no clubbing or cyanosis to extremities, no chest wall tenderness  Psychiatric: Appropriate affect, cooperative  Neurologic: Oriented x 3, strength symmetric in all extremities, Cranial Nerves grossly intact to confrontation, speech clear  Skin: No rashes    Results Reviewed:  I have personally reviewed current lab, radiology, and data and agree.    Results from last 7 days   Lab Units 04/19/19 2109   WBC 10*3/mm3 9.58   HEMOGLOBIN g/dL 13.1   HEMATOCRIT % 39.9   PLATELETS 10*3/mm3 268     Results from last 7 days   Lab Units 04/19/19 2110 04/19/19 2109   SODIUM mmol/L  --  135*   POTASSIUM mmol/L  --  4.3   CHLORIDE mmol/L  --  98   CO2 mmol/L  --  24.0   BUN mg/dL  --  13   CREATININE mg/dL  --  0.88   GLUCOSE mg/dL  --  112*   CALCIUM mg/dL  --  9.9   ALT (SGPT) U/L  --  89*   AST (SGOT) U/L  --  71*   TROPONIN I ng/mL 2.51*  --      Estimated Creatinine Clearance: 99.8 mL/min (by C-G formula based on SCr of 0.88 mg/dL).  Brief Urine Lab Results     None        No results found for: BNP  Imaging Results (last 24 hours)     Procedure Component Value Units Date/Time    XR Chest 1 View [[id number]] Collected:  04/19/19 2124     Updated:  04/19/19 2126    Narrative:       CR Chest 1 Vw    INDICATION:   Presents to the ER with mid chest pain     COMPARISON:    None available.    FINDINGS:  Single portable AP view of the chest.  The heart and mediastinal contours are normal. The lungs are clear. No pneumothorax or pleural effusion.      Impression:       No acute cardiopulmonary findings.    Signer Name: Florentin Trevino MD   Signed: 4/19/2019 9:24 PM   Workstation Name: [email or web address]            Assessment/Plan   Assessment / Plan     Active Hospital Problems    Diagnosis POA   • Essential  hypertension [I10] Yes   • Hx of non-ST elevation myocardial infarction (NSTEMI) [I25.2] Not Applicable   • Hyperglycemia [R73.9] Yes   • Elevated transaminase level [R74.0] Yes   • Angina at rest (CMS/HCC) [I20.8] Yes     Chest Pain   Hx of NSTEMI  --Heart cath with balloon angioplasty on 4/15/19,100% occlusion of Mid Left Circumflex  --Echo on 4/15/19 with EF of 45-50% with mild hypokinesis of the basal-midinferolateral and inferior myocardium  --During last hospitalization Troponins peaked at 30  --Troponin 2.51 --> 0.802, will continue to trend   --EKG NSR with no previous to compare  --Normal CXR   --Cardiology consult  --Keep NPO   --Continue Lovenox  --Currently pain is 1/10, NTG as needed    --Continue asa, plavix, statin, lisinopril, metoprolol   --CBC, CMP, A1c, FLP, TSH in AM    Hyperglycemia  --Glucose 112  --A1c in AM    Elevated Transaminase   --ALT/AST 89/71  --Hepatitis panel   --CMP in AM    Essential Hypertension  --Continue Lisinopril, Metoprolol     DVT prophylaxis: Anticoagulated with Lovenox     CODE STATUS:    Code Status and Medical Interventions:   Ordered at: 04/20/19 0238     Level Of Support Discussed With:    Patient     Code Status:    CPR     Medical Interventions (Level of Support Prior to Arrest):    Full     Admission Status:  I believe this patient meets INPATIENT status due to the need for care which can only be reasonably provided in an hospital setting such as possible need for aggressive/expedited ancillary services and/or consultation services, IV medications, close physician monitoring, and/or procedures.  In such, I feel patient’s risk for adverse outcomes and need for care warrant INPATIENT evaluation and predict the patient’s care encounter to likely last beyond 2 midnights.    Electronically signed by Francine Aragon PA-C, 04/20/19, 1:13 AM.    Brief Attending Admission Attestation     I have seen and examined the patient, performing an independent face-to-face diagnostic  evaluation with plan of care reviewed and developed with the advanced practice clinician (APC).      Brief Summary Statement:   Michelle Bird is a 44 y.o. female was recently diagnosed with non-STEMI in 14 April 2019 at the [place], [place].  Patient underwent left heart catheterization which revealed 100% occlusion of the left mid circumflex artery.  Stenting of the artery was impossible due to its small size but angioplasty was performed and patient started on medical treatment.  She is currently on Plavix, aspirin, atorvastatin 80 mg, lisinopril and metoprolol.  Patient presented to the ED tonight due to soreness in the mid chest.  She denies any other symptoms including nausea, vomiting, shortness of breath, fever, chills or diaphoresis.  Pain was localized.  Initial troponin in the ED was 2.51 but EKG did not show evidence of acute abnormalities.  Patient was recommended for observation.  Troponin has trended down to 0.8.  Patient is currently hemodynamically stable and chest pain-free.  Will consult cardiology in the morning.    Remainder of detailed HPI is as noted above and has been reviewed and/or edited by me for completeness.      Attending Physical Exam:  Constitutional: Awake, alert  Eyes: PERRLA, sclerae anicteric, no conjunctival injection  HENT: NCAT, mucous membranes moist  Neck: Supple, no thyromegaly, no lymphadenopathy, trachea midline  Respiratory: Clear to auscultation bilaterally, nonlabored respirations   Cardiovascular: RRR, no murmurs, rubs, or gallops, palpable pedal pulses bilaterally  Gastrointestinal: Positive bowel sounds, soft, nontender, nondistended  Musculoskeletal: No bilateral ankle edema, no clubbing or cyanosis to extremities  Psychiatric: Appropriate affect, cooperative  Neurologic: Oriented x 3, strength symmetric in all extremities, Cranial Nerves grossly intact, speech clear  Skin: No rashes      Brief Assessment/Plan :  See above for further detailed assessment  and plan developed with APC which I have reviewed and/or edited for completeness.      Electronically signed by Ovidio Mcmahon MD, 04/20/19, 3:45 AM.

## 2019-04-20 NOTE — DISCHARGE SUMMARY
[facility] Medicine Services  DISCHARGE SUMMARY    Patient Name: Michelle Bird  : 1975  MRN: [id number]    Date of Admission: 2019  Date of Discharge:  2019  Primary Care Physician: Bairon, No Known    Consults     Date and Time Order Name Status Description    2019 0238 Inpatient Cardiology Consult Completed           Hospital Course     Presenting Problem:   Angina at rest (CMS/HCC) [I20.8]    Active Hospital Problems    Diagnosis  POA   • Essential hypertension [I10]  Yes   • Hx of non-ST elevation myocardial infarction (NSTEMI) [I25.2]  Not Applicable      Resolved Hospital Problems    Diagnosis Date Resolved POA   • Hyperglycemia [R73.9] 2019 Yes   • Elevated transaminase level [R74.0] 2019 Yes   • Angina at rest (CMS/HCC) [I20.8] 2019 Yes      Hospital Course:  Michelle Bird is a 44 y.o. female with history of hypertension, recent (2019) NSTEMI s/p C with ballon angioplasty to the left mid circumflex (100% occlusion) she p/w  Chest pain. Her troponin were initially elevated but trended down her EKG was unrevealing. Her pain had resolved. Cardiology was consulted they recommended she continues her ASA, Plavix, statin, ACEi and BB, she was prescribed sublingual nitro and has been advised to f/u with her cardiologist in [place] as scheduled.    Discharge Follow Up Recommendations for labs/diagnostics:  F/u with primary cardiologist next week as scheduled 2019    Day of Discharge     HPI: No acute events overnight, patient states that she feels fine, denies any ongoing chest pain    Review of Systems  Gen- No fevers, chills  CV- No chest pain, palpitations  Resp- No cough, dyspnea  GI- No N/V/D, abd pain    Otherwise ROS is negative except as mentioned in the HPI.    Vital Signs:   Temp:  [97.5 °F (36.4 °C)-98.1 °F (36.7 °C)] 97.5 °F (36.4 °C)  Heart Rate:  [75-98] 90  Resp:  [16-17] 17  BP: (108-131)/(72-97) 124/77     Physical  Exam:  Constitutional: No acute distress, awake, alert  HENT: NCAT, mucous membranes moist  Respiratory: Clear to auscultation bilaterally, respiratory effort normal   Cardiovascular: RRR, no murmurs, rubs, or gallops, palpable pedal pulses bilaterally  Gastrointestinal: Positive bowel sounds, soft, nontender, nondistended  Musculoskeletal: No bilateral ankle edema  Psychiatric: Appropriate affect, cooperative  Neurologic: Oriented x 3, no focal deficits  Skin: No rashes    Pertinent  and/or Most Recent Results     Results from last 7 days   Lab Units 04/20/19  0524 04/19/19 2109   WBC 10*3/mm3 10.13 9.58   HEMOGLOBIN g/dL 11.7* 13.1   HEMATOCRIT % 36.4 39.9   PLATELETS 10*3/mm3 227 268   SODIUM mmol/L 136 135*   POTASSIUM mmol/L 3.9 4.3   CHLORIDE mmol/L 100 98   CO2 mmol/L 22.0 24.0   BUN mg/dL 14 13   CREATININE mg/dL 0.80 0.88   GLUCOSE mg/dL 105* 112*   CALCIUM mg/dL 9.0 9.9     Results from last 7 days   Lab Units 04/20/19  0524 04/19/19 2109   BILIRUBIN mg/dL 0.4 0.4   ALK PHOS U/L 83 95   ALT (SGPT) U/L 75* 89*   AST (SGOT) U/L 48* 71*   PROTIME Seconds 13.2  --    INR  1.05  --      Results from last 7 days   Lab Units 04/20/19 0524   CHOLESTEROL mg/dL 118   TRIGLYCERIDES mg/dL 136   HDL CHOL mg/dL 38*     Results from last 7 days   Lab Units 04/20/19  0524 04/19/19 2110   TSH mIU/mL 3.560  --    HEMOGLOBIN A1C % 4.90  --    TROPONIN I ng/mL  --  2.51*       Brief Urine Lab Results     None          Microbiology Results Abnormal     None          Imaging Results (all)     Procedure Component Value Units Date/Time    XR Chest 1 View [[id number]] Collected:  04/19/19 2124     Updated:  04/19/19 2126    Narrative:       CR Chest 1 Vw    INDICATION:   Presents to the ER with mid chest pain     COMPARISON:    None available.    FINDINGS:  Single portable AP view of the chest.  The heart and mediastinal contours are normal. The lungs are clear. No pneumothorax or pleural effusion.      Impression:       No  acute cardiopulmonary findings.    Signer Name: Florentin Trevino MD   Signed: 4/19/2019 9:24 PM   Workstation Name: [email or web address]             Discharge Details        Discharge Medications      New Medications      Instructions Start Date   nitroglycerin 0.4 MG SL tablet  Commonly known as:  NITROSTAT   0.4 mg, Sublingual, Every 5 Minutes PRN, Take no more than 3 doses in 15 minutes.      PHARMACY MEDS TO BED CONSULT   Does not apply, Daily   Start Date:  4/21/2019        Continue These Medications      Instructions Start Date   aspirin 81 MG EC tablet   81 mg, Oral, Daily      atorvastatin 80 MG tablet  Commonly known as:  LIPITOR   80 mg, Oral, Nightly      clopidogrel 75 MG tablet  Commonly known as:  PLAVIX   75 mg, Oral, Daily      lisinopril 5 MG tablet  Commonly known as:  PRINIVIL,ZESTRIL   2.5 mg, Oral, Daily      metoprolol tartrate 25 MG tablet  Commonly known as:  LOPRESSOR   12.5 mg, Oral, 2 Times Daily             No Known Allergies    Discharge Disposition:Home  Home or Self Care    Discharge Diet:  Diet Order   Procedures   • NPO Diet       Discharge Activity: As tolerated    CODE STATUS:    Code Status and Medical Interventions:   Ordered at: 04/20/19 0238     Level Of Support Discussed With:    Patient     Code Status:    CPR     Medical Interventions (Level of Support Prior to Arrest):    Full         No future appointments.    Time Spent on Discharge: 35 minutes    Electronically signed by Connie Booker MD, 04/20/19, 11:25 AM.

## 2019-04-20 NOTE — DISCHARGE INSTR - APPOINTMENTS
See your Cardiologist April 24 th as scheduled.    Notify your Primary Care Physician on 4- to inform of current hospitilation and might need to be seen.

## 2019-04-22 PROBLEM — I20.8 ANGINA AT REST (HCC): Status: ACTIVE | Noted: 2019-04-22
